# Patient Record
Sex: FEMALE | Race: OTHER | Employment: UNEMPLOYED | URBAN - METROPOLITAN AREA
[De-identification: names, ages, dates, MRNs, and addresses within clinical notes are randomized per-mention and may not be internally consistent; named-entity substitution may affect disease eponyms.]

---

## 2017-05-08 PROBLEM — F32.A DEPRESSION: Status: ACTIVE | Noted: 2017-05-08

## 2017-05-09 PROBLEM — T50.902A DRUG OVERDOSE, INTENTIONAL (HCC): Status: ACTIVE | Noted: 2017-05-09

## 2017-07-13 ENCOUNTER — APPOINTMENT (OUTPATIENT)
Dept: WOUND CARE | Facility: HOSPITAL | Age: 27
End: 2017-07-13
Attending: CLINICAL NURSE SPECIALIST

## 2018-01-20 NOTE — PROGRESS NOTES
1/20/2018  12:28 PM    Martha Curry is a 32year old female. Chief complaint(s): Patient presents with:  Fatigue  Varicose Veins    HPI:     Martha Curry primary complaint is regarding as above.      Martha Curry is a 32year old female he patient. Medications (Active prior to today's visit):    No current outpatient prescriptions on file. Allergies:  No Known Allergies      ROS:   Review of Systems   Constitutional: Negative for chills, fatigue and fever.    HENT: Negative for ear pain ([facility] [address]    MEDICATIONS:     No prescriptions requested or ordered in this encounter          REFERRALS: 1SPECIALTY (OTHER) - EXTERNAL   NAVIGATOR,      RECOMMENDATIONS given include: Please, call our office with any questions or concerns.  Notify Dr Mondragon Generous

## 2019-12-25 ENCOUNTER — HOSPITAL (OUTPATIENT)
Dept: OTHER | Age: 29
End: 2019-12-25

## 2019-12-25 LAB
ALBUMIN SERPL-MCNC: 4.3 G/DL (ref 3.6–5.1)
ALBUMIN/GLOB SERPL: 1.1 {RATIO} (ref 1–2.4)
ALP SERPL-CCNC: 76 UNITS/L (ref 45–117)
ALT SERPL-CCNC: 19 UNITS/L
AMPHETAMINES UR QL SCN>500 NG/ML: NEGATIVE
AMPHETAMINES UR QL SCN>500 NG/ML: NORMAL
ANALYZER ANC (IANC): ABNORMAL
ANION GAP SERPL CALC-SCNC: 9 MMOL/L (ref 10–20)
APAP SERPL-MCNC: <2 MCG/ML (ref 10–30)
AST SERPL-CCNC: 12 UNITS/L
BARBITURATES UR QL SCN>200 NG/ML: NEGATIVE
BARBITURATES UR QL SCN>200 NG/ML: NORMAL
BASOPHILS # BLD: 0 K/MCL (ref 0–0.3)
BASOPHILS NFR BLD: 0 %
BENZODIAZ UR QL SCN>200 NG/ML: NEGATIVE
BENZODIAZ UR QL SCN>200 NG/ML: NORMAL
BILIRUB SERPL-MCNC: 0.3 MG/DL (ref 0.2–1)
BUN SERPL-MCNC: 9 MG/DL (ref 6–20)
BUN/CREAT SERPL: 15 (ref 7–25)
BZE UR QL SCN>150 NG/ML: NEGATIVE
BZE UR QL SCN>150 NG/ML: NORMAL
CALCIUM SERPL-MCNC: 8.9 MG/DL (ref 8.4–10.2)
CANNABINOIDS UR QL SCN>50 NG/ML: NEGATIVE
CANNABINOIDS UR QL SCN>50 NG/ML: NORMAL
CHLORIDE SERPL-SCNC: 107 MMOL/L (ref 98–107)
CO2 SERPL-SCNC: 28 MMOL/L (ref 21–32)
CREAT SERPL-MCNC: 0.58 MG/DL (ref 0.51–0.95)
DIFFERENTIAL METHOD BLD: ABNORMAL
EOSINOPHIL # BLD: 0 K/MCL (ref 0.1–0.5)
EOSINOPHIL NFR BLD: 0 %
ERYTHROCYTE [DISTWIDTH] IN BLOOD: 12.6 % (ref 11–15)
ETHANOL SERPL-MCNC: NORMAL MG/DL
GLOBULIN SER-MCNC: 3.8 G/DL (ref 2–4)
GLUCOSE SERPL-MCNC: 102 MG/DL (ref 65–99)
HCG POINT OF CARE (5HGRST): NEGATIVE
HCT VFR BLD CALC: 35.5 % (ref 36–46.5)
HGB BLD-MCNC: 11.8 G/DL (ref 12–15.5)
IMM GRANULOCYTES # BLD AUTO: 0 K/MCL (ref 0–0.2)
IMM GRANULOCYTES NFR BLD: 0 %
LYMPHOCYTES # BLD: 1.2 K/MCL (ref 1–4.8)
LYMPHOCYTES NFR BLD: 16 %
MCH RBC QN AUTO: 29.8 PG (ref 26–34)
MCHC RBC AUTO-ENTMCNC: 33.2 G/DL (ref 32–36.5)
MCV RBC AUTO: 89.6 FL (ref 78–100)
MONOCYTES # BLD: 0.5 K/MCL (ref 0.3–0.9)
MONOCYTES NFR BLD: 6 %
NEUTROPHILS # BLD: 5.4 K/MCL (ref 1.8–7.7)
NEUTROPHILS NFR BLD: 78 %
NEUTS SEG NFR BLD: ABNORMAL %
NRBC (NRBCRE): 0 /100 WBC
OPIATES UR QL SCN>300 NG/ML: NEGATIVE
OPIATES UR QL SCN>300 NG/ML: NORMAL
PCP UR QL SCN>25 NG/ML: NEGATIVE
PCP UR QL SCN>25 NG/ML: NORMAL
PCP UR QL SCN>25 NG/ML: NORMAL
PLATELET # BLD: 286 K/MCL (ref 140–450)
POTASSIUM SERPL-SCNC: 3.8 MMOL/L (ref 3.4–5.1)
PROT SERPL-MCNC: 8.1 G/DL (ref 6.4–8.2)
RBC # BLD: 3.96 MIL/MCL (ref 4–5.2)
SALICYLATES SERPL-MCNC: <2.8 MG/DL
SODIUM SERPL-SCNC: 140 MMOL/L (ref 135–145)
WBC # BLD: 7 K/MCL (ref 4.2–11)

## 2019-12-25 PROCEDURE — 99285 EMERGENCY DEPT VISIT HI MDM: CPT | Performed by: EMERGENCY MEDICINE

## 2019-12-26 ENCOUNTER — DIAGNOSTIC TRANS (OUTPATIENT)
Dept: OTHER | Age: 29
End: 2019-12-26

## 2019-12-26 PROCEDURE — 93010 ELECTROCARDIOGRAM REPORT: CPT | Performed by: INTERNAL MEDICINE

## 2024-04-13 ENCOUNTER — HOSPITAL ENCOUNTER (EMERGENCY)
Facility: HOSPITAL | Age: 34
Discharge: HOME | End: 2024-04-14
Attending: EMERGENCY MEDICINE
Payer: COMMERCIAL

## 2024-04-13 VITALS
SYSTOLIC BLOOD PRESSURE: 107 MMHG | HEART RATE: 69 BPM | OXYGEN SATURATION: 99 % | TEMPERATURE: 99 F | WEIGHT: 140 LBS | BODY MASS INDEX: 28.22 KG/M2 | HEIGHT: 59 IN | RESPIRATION RATE: 18 BRPM | DIASTOLIC BLOOD PRESSURE: 70 MMHG

## 2024-04-13 DIAGNOSIS — B34.9 VIRAL SYNDROME: Primary | ICD-10-CM

## 2024-04-13 PROCEDURE — 0241U SARS-COV-2/FLU A AND B/RSV BY PCR (GENEXPERT): CPT | Performed by: EMERGENCY MEDICINE

## 2024-04-13 PROCEDURE — 99283 EMERGENCY DEPT VISIT LOW MDM: CPT

## 2024-04-13 PROCEDURE — 0241U SARS-COV-2/FLU A AND B/RSV BY PCR (GENEXPERT): CPT

## 2024-04-13 RX ORDER — FLUTICASONE PROPIONATE 50 MCG
2 SPRAY, SUSPENSION (ML) NASAL DAILY
Qty: 16 G | Refills: 0 | Status: SHIPPED | OUTPATIENT
Start: 2024-04-13 | End: 2024-05-13

## 2024-04-13 RX ORDER — CETIRIZINE HYDROCHLORIDE 10 MG/1
10 TABLET ORAL DAILY
Qty: 30 TABLET | Refills: 0 | Status: SHIPPED | OUTPATIENT
Start: 2024-04-13 | End: 2024-05-13

## 2024-04-14 LAB
FLUAV + FLUBV RNA SPEC NAA+PROBE: NEGATIVE
FLUAV + FLUBV RNA SPEC NAA+PROBE: POSITIVE
RSV RNA SPEC NAA+PROBE: NEGATIVE
SARS-COV-2 RNA RESP QL NAA+PROBE: NOT DETECTED

## 2024-04-14 NOTE — ED PROVIDER NOTES
Patient Seen in: [facility] Emergency Department    History   No chief complaint on file.      HPI    History is provided by patient/independent historian: patient  33 year old female with no significant past medical history here with complaints of flulike symptoms for the last 5 days.  The first few days were the worst, but she did start eating more.  She is still left with runny nose, chills, and feeling fatigued.  Her family was concerned she was feeling fatigued because her blood pressure was elevated.  She does not normally see a doctor.    History reviewed. History reviewed. No pertinent past medical history.      History reviewed.   Past Surgical History:   Procedure Laterality Date               Home Medications reviewed :  (Not in a hospital admission)        History reviewed.   Social History     Socioeconomic History    Marital status:    Tobacco Use    Smoking status: Never    Smokeless tobacco: Never   Substance and Sexual Activity    Alcohol use: Yes     Comment: Will drink ETOH socially, I will drink 1 or 2 glasses of wine.    Drug use: No    Sexual activity: Never     Partners: Male         ROS  Review of Systems   Constitutional:  Positive for chills and fatigue.   HENT:  Positive for rhinorrhea.    Respiratory:  Negative for shortness of breath.    Cardiovascular:  Negative for chest pain.   All other systems reviewed and are negative.     All other pertinent organ systems are reviewed and are negative.      Physical Exam     ED Triage Vitals [24 2334]   /70   Pulse 69   Resp 18   Temp 98.9 °F (37.2 °C)   Temp src Temporal   SpO2 99 %   O2 Device None (Room air)     Vital signs reviewed.      Physical Exam  Vitals and nursing note reviewed.   Cardiovascular:      Pulses: Normal pulses.   Pulmonary:      Effort: No respiratory distress.   Abdominal:      General: There is no distension.   Neurological:      Mental Status: She is alert.         ED Course       Labs:      Labs Reviewed   SARS-COV-2/FLU A AND B/RSV BY PCR (GENEXPERT)         My EKG Interpretation:   As reviewed and Interpreted by me      Imaging Results Available and Reviewed while in ED:   No results found.      Decision rules/scores evaluated: none      Diagnostic labs/tests considered but not ordered: CBC, BMP, CRP, CXR    ED Medications Administered: Medications - No data to display         - pt to f/u on mychart for genex results    MDM       Medical Decision Making      Differential Diagnosis: After obtaining the patient's history, performing the physical exam and reviewing the diagnostics, multiple initial diagnoses were considered based on the presenting problem including pneumonia, viral syndrome, strep pharyngitis, hypertension    External document review: I personally reviewed available external medical records for any recent pertinent discharge summaries, testing, and procedures - the findings are as follows: 6/12/20 visit with Dr. Villegas for covid    Complicating Factors: The patient already  has no past medical history on file. to contribute to the complexity of this ED evaluation.    Procedures performed: none    Discussed management with physician/appropriate source: none    Considered admission/deescalation of care for: none    Social determinants of health affecting patient care: none    Prescription medications considered: discussed continuing current medication regimen    The patient requires continuous monitoring for: fatigue, chills, rhinorrhea    Shared decision making: discussed possible admission        Disposition and Plan     Clinical Impression:  1. Viral syndrome        Disposition:  Discharge    Follow-up:  No follow-up provider specified.    Medications Prescribed:  Current Discharge Medication List        START taking these medications    Details   cetirizine 10 MG Oral Tab Take 1 tablet (10 mg total) by mouth daily.  Qty: 30 tablet, Refills: 0      fluticasone propionate 50 MCG/ACT  Nasal Suspension 2 sprays by Nasal route daily.  Qty: 16 g, Refills: 0

## 2024-12-15 ENCOUNTER — HOSPITAL ENCOUNTER (EMERGENCY)
Age: 34
Discharge: HOME | End: 2024-12-15
Attending: EMERGENCY MEDICINE

## 2024-12-15 ENCOUNTER — APPOINTMENT (OUTPATIENT)
Dept: ULTRASOUND IMAGING | Age: 34
End: 2024-12-15
Attending: EMERGENCY MEDICINE

## 2024-12-15 VITALS
HEART RATE: 80 BPM | SYSTOLIC BLOOD PRESSURE: 134 MMHG | TEMPERATURE: 97 F | RESPIRATION RATE: 16 BRPM | BODY MASS INDEX: 29 KG/M2 | DIASTOLIC BLOOD PRESSURE: 82 MMHG | OXYGEN SATURATION: 99 % | WEIGHT: 144.38 LBS

## 2024-12-15 DIAGNOSIS — I80.01 THROMBOPHLEBITIS OF SUPERFICIAL VEINS OF RIGHT LOWER EXTREMITY: Primary | ICD-10-CM

## 2024-12-15 LAB
ALBUMIN SERPL-MCNC: 4.1 G/DL (ref 3.2–4.8)
ALBUMIN/GLOB SERPL: 1.6 {RATIO} (ref 1–2)
ALP LIVER SERPL-CCNC: 52 U/L
ALT SERPL-CCNC: 12 U/L
ANION GAP SERPL CALC-SCNC: 5 MMOL/L (ref 0–18)
APTT PPP: 26.1 SECONDS (ref 23–36)
AST SERPL-CCNC: 18 U/L (ref ?–34)
BASOPHILS # BLD AUTO: 0.02 X10(3) UL (ref 0–0.2)
BASOPHILS NFR BLD AUTO: 0.3 %
BILIRUB SERPL-MCNC: 0.4 MG/DL (ref 0.3–1.2)
BUN BLD-MCNC: 7 MG/DL (ref 9–23)
CALCIUM BLD-MCNC: 9.7 MG/DL (ref 8.7–10.4)
CHLORIDE SERPL-SCNC: 108 MMOL/L (ref 98–112)
CO2 SERPL-SCNC: 24 MMOL/L (ref 21–32)
CREAT BLD-MCNC: 0.51 MG/DL
EGFRCR SERPLBLD CKD-EPI 2021: 124 ML/MIN/1.73M2 (ref 60–?)
EOSINOPHIL # BLD AUTO: 0.02 X10(3) UL (ref 0–0.7)
EOSINOPHIL NFR BLD AUTO: 0.3 %
ERYTHROCYTE [DISTWIDTH] IN BLOOD BY AUTOMATED COUNT: 13.1 %
GLOBULIN PLAS-MCNC: 2.6 G/DL (ref 2–3.5)
GLUCOSE BLD-MCNC: 108 MG/DL (ref 70–99)
HCT VFR BLD AUTO: 30.8 %
HGB BLD-MCNC: 10.9 G/DL
IMM GRANULOCYTES # BLD AUTO: 0.02 X10(3) UL (ref 0–1)
IMM GRANULOCYTES NFR BLD: 0.3 %
INR BLD: 0.94 (ref 0.8–1.2)
LYMPHOCYTES # BLD AUTO: 1.17 X10(3) UL (ref 1–4)
LYMPHOCYTES NFR BLD AUTO: 18 %
MCH RBC QN AUTO: 32.5 PG (ref 26–34)
MCHC RBC AUTO-ENTMCNC: 35.4 G/DL (ref 31–37)
MCV RBC AUTO: 91.9 FL
MONOCYTES # BLD AUTO: 0.43 X10(3) UL (ref 0.1–1)
MONOCYTES NFR BLD AUTO: 6.6 %
NEUTROPHILS # BLD AUTO: 4.83 X10 (3) UL (ref 1.5–7.7)
NEUTROPHILS # BLD AUTO: 4.83 X10(3) UL (ref 1.5–7.7)
NEUTROPHILS NFR BLD AUTO: 74.5 %
OSMOLALITY SERPL CALC.SUM OF ELEC: 283 MOSM/KG (ref 275–295)
PLATELET # BLD AUTO: 240 10(3)UL (ref 150–450)
POTASSIUM SERPL-SCNC: 3.5 MMOL/L (ref 3.5–5.1)
PROT SERPL-MCNC: 6.7 G/DL (ref 5.7–8.2)
PROTHROMBIN TIME: 12.4 SECONDS (ref 11.6–14.8)
RBC # BLD AUTO: 3.35 X10(6)UL
SODIUM SERPL-SCNC: 137 MMOL/L (ref 136–145)
WBC # BLD AUTO: 6.5 X10(3) UL (ref 4–11)

## 2024-12-15 PROCEDURE — 85610 PROTHROMBIN TIME: CPT | Performed by: EMERGENCY MEDICINE

## 2024-12-15 PROCEDURE — 96372 THER/PROPH/DIAG INJ SC/IM: CPT

## 2024-12-15 PROCEDURE — 93971 EXTREMITY STUDY: CPT | Performed by: EMERGENCY MEDICINE

## 2024-12-15 PROCEDURE — 80053 COMPREHEN METABOLIC PANEL: CPT | Performed by: EMERGENCY MEDICINE

## 2024-12-15 PROCEDURE — 85730 THROMBOPLASTIN TIME PARTIAL: CPT | Performed by: EMERGENCY MEDICINE

## 2024-12-15 PROCEDURE — 99284 EMERGENCY DEPT VISIT MOD MDM: CPT

## 2024-12-15 PROCEDURE — 36415 COLL VENOUS BLD VENIPUNCTURE: CPT

## 2024-12-15 PROCEDURE — 99285 EMERGENCY DEPT VISIT HI MDM: CPT

## 2024-12-15 PROCEDURE — 85025 COMPLETE CBC W/AUTO DIFF WBC: CPT | Performed by: EMERGENCY MEDICINE

## 2024-12-15 RX ORDER — ENOXAPARIN SODIUM 100 MG/ML
80 INJECTION SUBCUTANEOUS ONCE
Status: COMPLETED | OUTPATIENT
Start: 2024-12-15 | End: 2024-12-15

## 2024-12-15 RX ORDER — ENOXAPARIN SODIUM 100 MG/ML
1 INJECTION SUBCUTANEOUS NIGHTLY
Qty: 19.8 ML | Refills: 0 | Status: SHIPPED | OUTPATIENT
Start: 2024-12-15 | End: 2025-01-14

## 2024-12-15 RX ORDER — CHOLECALCIFEROL (VITAMIN D3) 25 MCG
1 TABLET,CHEWABLE ORAL DAILY
COMMUNITY

## 2024-12-15 NOTE — ED PROVIDER NOTES
Patient Seen in: [facility] [place]      History     Chief Complaint   Patient presents with    Deep Vein Thrombosis     Pt states that she is pregnant and developed a \"purple area on her leg.\" Pt states that she was told to come r/o blood clot in her leg     Stated Complaint: right leg \"purple area\" Pt states that she is 4 months pregnant    Subjective:   HPI      Patient sent for evaluation of a area of bruising on her right inner thigh.  She said this been there for some time but it comes and goes.  She has no chest pain or shortness of breath or palpitations.  No lightheadedness.  No history of VTE.  She is 16 weeks pregnant.  She denies any abdominal or pelvic complaints, no vaginal bleeding or discharge.  Denies any history of intracranial hemorrhage or GI bleeds.        Objective:     History reviewed. No pertinent past medical history.           Past Surgical History:   Procedure Laterality Date                      Social History     Socioeconomic History    Marital status:    Tobacco Use    Smoking status: Never    Smokeless tobacco: Never   Substance and Sexual Activity    Alcohol use: Yes     Comment: Will drink ETOH socially, I will drink 1 or 2 glasses of wine.    Drug use: No    Sexual activity: Never     Partners: Male     Social Drivers of Health     Financial Resource Strain: Not at Risk (2024)    Received from [employer]    Financial Resource Strain     Financial Resource Strain: 1   Food Insecurity: Not at Risk (2024)    Received from [employer]    Food Insecurity     Food: 1   Transportation Needs: Not at Risk (2024)    Received from [employer]    Transportation Needs     Transportation: 1   Stress: Not on File (10/30/2024)    Received from [employer]    Stress     Stress: 0   Housing Stability: Not at Risk (2024)    Received from [employer]    Housing Stability     Housin                  Physical Exam     ED Triage Vitals [12/15/24 1230]   /82   Pulse  80   Resp 16   Temp 97 °F (36.1 °C)   Temp src Temporal   SpO2 99 %   O2 Device None (Room air)       Current Vitals:   Vital Signs  BP: 134/82  Pulse: 80  Resp: 16  Temp: 97 °F (36.1 °C)  Temp src: Temporal    Oxygen Therapy  SpO2: 99 %  O2 Device: None (Room air)        Physical Exam  Physical Exam   Constitutional: Awake, alert, well appearing  Head: Normocephalic and atraumatic.   Eyes: Conjunctivae are normal. Pupils are equal, round, and reactive to light.   Neck: Normal range of motion. No JVD  Cardiovascular: Normal rate, regular rhythm  Pulmonary/Chest: Normal effort.  No accessory muscle use.  No cyanosis.  Abdominal: Soft. Not distended.  Neurological: Pt is alert and oriented to person, place, and time. no cranial nerve deficits. Speech fluent      Ecchymotic area above the knee on her right inner thigh.  Both calves are swollen nontender no edema easily palpated pulses.  Good skin color.    ED Course     Labs Reviewed   CBC WITH DIFFERENTIAL WITH PLATELET - Abnormal; Notable for the following components:       Result Value    RBC 3.35 (*)     HGB 10.9 (*)     HCT 30.8 (*)     All other components within normal limits   COMP METABOLIC PANEL (14)   PROTHROMBIN TIME (PT)   PTT, ACTIVATED              US VENOUS DOPPLER LEG RIGHT - DIAG IMG (CPT=93971)    Result Date: 12/15/2024  PROCEDURE:  US VENOUS DOPPLER LEG RIGHT - DIAG IMG (CPT=93971)  COMPARISON:  None.  INDICATIONS:  eval for DVT.  Right leg pain and swelling and discoloration.  TECHNIQUE:  Real time, grey scale, and duplex ultrasound was used to evaluate the lower extremity venous system. B-mode two-dimensional images of the vascular structures, Doppler spectral analysis, and color flow.  Doppler imaging were performed.  The following veins were imaged:  Common, deep, and superficial femoral, popliteal, sapheno-femoral junction, posterior tibial veins, and the contralateral common femoral vein.  PATIENT STATED HISTORY: (As transcribed by  Technologist)  Patient states she has bruising, warmth, and pain to the right medial knee.  She also states she has veins posterior upper right thigh that she is concerned about.    FINDINGS:  EXTREMITY EXAMINED:  Right leg SAPHENOFEMORAL JUNCTION:  No reflux. THROMBI:  There is no DVT visualized in the right leg.  There is superficial thrombus in the right greater saphenous vein from the level of the distal thigh to the knee over a length of 15 cm.  There are patent varicose veins 2-3 mm in diameter upper posterior right thigh at the site of pain COMPRESSION:  Normal compressibility, phasicity, and augmentation.            CONCLUSION:  There is no DVT in the right leg.  There is superficial thrombosis of the right greater saphenous vein over a length of 15 cm from the distal thigh to the knee in the area of skin bruising.  Patent varicose veins 2-3 mm in diameter upper posterior right thigh at the area of clinical concern per patient.   LOCATION:  [id number]    Dictated by (CST): Zahraa Thakkar MD on 12/15/2024 at 1:18 PM     Finalized by (CST): Zahraa Thakkar MD on 12/15/2024 at 1:19 PM             MDM            Differential diagnoses considered: DVT, SVT, ecchymosis    -Patient has a long segment of superficial venous thrombus in her greater saphenous vein.    -Discussed briefly with Dr. Erickson, hematology, agrees with intermediate dose Lovenox, 1 mg/kg daily.  She is weighing 65 kg I will give her 60 mg daily.  Here in [place] we only have 80 mg vials so I will give her her first dose today and asked her to take this nightly going forward.    I am going to check basic labs to make sure that her liver and kidney function are fine which they have been.  If these are normal she can discharged home.  RN will do bedside teaching on Lovenox injections with  present.      I visualized the radiology studies, my independent interpretation: No DVT present on ultrasound.    *Discussion of ongoing  management of this patient's care included: Dr. Erickson, hematology  *Comorbidities contributing to the complexity of decision making:  pregnant status    *External charts reviewed: n/a  *Additional sources of history:  services were offered.  Patient was comfortable with staff serving as 's.  All discharge instructions were given and questions were answered in her native language.    Shared decision making was done by: patient, myself.          Medical Decision Making      Disposition and Plan     Clinical Impression:  1. Thrombophlebitis of superficial veins of right lower extremity         Disposition:  Discharge  12/15/2024  1:59 pm    Follow-up:  Belinda Erickson MD  [address] [place] [id number]  [phone]    Call in 1 day(s)            Medications Prescribed:  Current Discharge Medication List        START taking these medications    Details   enoxaparin 60 MG/0.6ML Injection Solution Prefilled Syringe Inject 0.66 mL (66 mg total) into the skin at bedtime.  Qty: 19.8 mL, Refills: 0                 Supplementary Documentation:

## 2024-12-15 NOTE — ED INITIAL ASSESSMENT (HPI)
States she is approx 16 wks pregnant and noticed purple area behind her right knee and upper calf for 10 days. No injury

## 2024-12-15 NOTE — ED PROVIDER NOTES
Slightly low hemoglobin that would not be unanticipated with pregnancy otherwise unremarkable blood work patient discharged as per disposition

## 2024-12-16 ENCOUNTER — TELEPHONE (OUTPATIENT)
Dept: HEMATOLOGY/ONCOLOGY | Facility: HOSPITAL | Age: 34
End: 2024-12-16

## 2024-12-16 NOTE — TELEPHONE ENCOUNTER
----- Message from Belinda Erickson sent at 12/15/2024 10:00 PM CST -----  Pregnant patient-needs consult within 2 weeks-any provider [facility] OR [facility]

## 2025-01-15 NOTE — TELEPHONE ENCOUNTER
Pt is calling to schedule a new consult appt.    New Consult-Dr. Erickson  Referred by-Dr. Garcia- bruising in leg  Insurance-Medicaid  Please give pt a call back. Thank you.

## 2025-01-22 NOTE — PROGRESS NOTES
[facility] Hematology and Oncology Clinic Note      Visit Diagnosis:  1. Superficial thrombophlebitis, involving unspecified site        History of Present Illness: 34F referred by Dr. Summers to discuss a superficial thrombosis of the RLE. She is pregnant and due May 25, 2025.     -In 2024, she started to noticed more varicose veins and discoloration in RLE. She presented to the ER on 12/15/24 with right inner thigh discoloration (purple). No history of VTE. No family history of DVT. RLE doppler showed a superficial thrombosis of the right greater saphenous vein measuring 15 cm. No DVT. She was started on Lovenox 1 mg/kg daily.    Review of Systems: 12 Point ROS was completed and pertinent positives are in the HPI    Medications Ordered Prior to Encounter[1]  No past medical history on file.  Past Surgical History:   Procedure Laterality Date           Social History     Socioeconomic History    Marital status:    Tobacco Use    Smoking status: Never    Smokeless tobacco: Never   Substance and Sexual Activity    Alcohol use: Not Currently     Comment: Will drink ETOH socially, I will drink 1 or 2 glasses of wine.    Drug use: No    Sexual activity: Never     Partners: Male      Family History   Problem Relation Age of Onset    Diabetes Mother     Cancer Mother         cirrhrosis then cancer spread,liver Ca    Other (Other) Mother        Physical Exam  Height: --  Weight: 67 kg (147 lb 12.8 oz) (939)  BSA (Calculated - sq m): --  Pulse: 94 (939)  BP: 107/67 (939)  Temp: 98.1 °F (36.7 °C) (939)  Do Not Use - Resp Rate: --  SpO2: 98 % (939)    General: NAD, AOX3  HEENT: clear op, mmm, no jvd, no scleral icterus  LN: no supraclavicular, infraclavicular, axillary or inguinal LAD  CV: RRR S1S2 no murmurs  Extremities: RLE varicose veins, larger area of ecchymosis in inner R thigh  Lungs: CTAB, no increased work of breathing  Abd: soft nt nd +BS no  hepatosplenomegaly  Neuro: CN: II-XII grossly intact      Results:  Lab Results   Component Value Date    WBC 6.5 12/15/2024    HGB 10.9 (L) 12/15/2024    HCT 30.8 (L) 12/15/2024    MCV 91.9 12/15/2024    .0 12/15/2024       No results for input(s): \"GLU\", \"BUN\", \"CREATSERUM\", \"GFRAA\", \"GFRNAA\", \"EGFRCR\", \"CA\", \"ALB\", \"NA\", \"K\", \"CL\", \"CO2\", \"ALKPHO\", \"AST\", \"ALT\", \"BILT\", \"TP\" in the last 168 hours.    Radiology:  reviewed      Assessment and Plan:  RLE Superficial Venous Vein thrombosis (15 cm)  -This is a higher risk SVT given the length (>5 cm) and pregnancy  -We can continue lovenox 1 mg/kg daily. We will repeat an US to ensure no clot progression  -If there is clot progression, we can increase her dose to 1 mg/kg BID  -She will need to continue anticoagulation antepartum and 6 weeks postpartum   -Start heat and tylenol for cramping   -We can switch to heparin at 36 weeks     Mild Anemia  -Check Fe studies, B12, folate  -IV InFED for Ferritin < 50 or Fe sat < 20    RTC in 4 weeks for re-assessment     MONAE Erickson MD  [facility]         [1]   Current Outpatient Medications on File Prior to Visit   Medication Sig Dispense Refill    aspirin 81 MG Oral Tab EC Take 1 tablet (81 mg total) by mouth daily.      prenatal vitamin with DHA 27-0.8-228 MG Oral Cap Take 1 capsule by mouth daily.      enoxaparin 60 MG/0.6ML Injection Solution Prefilled Syringe Inject 0.66 mL (66 mg total) into the skin at bedtime. 19.8 mL 0     No current facility-administered medications on file prior to visit.

## 2025-01-24 ENCOUNTER — OFFICE VISIT (OUTPATIENT)
Age: 35
End: 2025-01-24
Attending: INTERNAL MEDICINE
Payer: MEDICAID

## 2025-01-24 ENCOUNTER — TELEPHONE (OUTPATIENT)
Age: 35
End: 2025-01-24

## 2025-01-24 ENCOUNTER — TELEPHONE (OUTPATIENT)
Dept: HEMATOLOGY/ONCOLOGY | Facility: HOSPITAL | Age: 35
End: 2025-01-24

## 2025-01-24 VITALS
RESPIRATION RATE: 18 BRPM | OXYGEN SATURATION: 98 % | DIASTOLIC BLOOD PRESSURE: 67 MMHG | HEART RATE: 94 BPM | TEMPERATURE: 98 F | BODY MASS INDEX: 30 KG/M2 | SYSTOLIC BLOOD PRESSURE: 107 MMHG | WEIGHT: 147.81 LBS

## 2025-01-24 DIAGNOSIS — I80.9 SUPERFICIAL THROMBOPHLEBITIS, INVOLVING UNSPECIFIED SITE: Primary | ICD-10-CM

## 2025-01-24 DIAGNOSIS — I82.811 SUPERFICIAL THROMBOSIS OF LEG, RIGHT: ICD-10-CM

## 2025-01-24 RX ORDER — ASPIRIN 81 MG/1
81 TABLET ORAL DAILY
COMMUNITY
Start: 2024-12-05

## 2025-01-24 NOTE — PROGRESS NOTES
Here for thrombophlebitis RLE. Causes her discomfort when she stands on it for long periods of time. She is taking lovenox injections. They are going well without any abnormal bleeding. She does not have a history of blood clots or family history of blood clots. She is due May 25th with a boy.

## 2025-01-24 NOTE — TELEPHONE ENCOUNTER
Marizol requested notes/labs from today's consult. She works with the Maternal Fetal Medicine doctor that Naina sees.

## 2025-01-24 NOTE — TELEPHONE ENCOUNTER
ASCTOM Fontana - [phone]     Marizol would like to get a call back [phone]  she   has information she would like to share. Thanks Sybil

## 2025-01-30 ENCOUNTER — TELEPHONE (OUTPATIENT)
Age: 35
End: 2025-01-30

## 2025-01-30 NOTE — TELEPHONE ENCOUNTER
Called regarding questions on mutual patient regarding appointment on 1/24/2025.  Left message to return call.

## 2025-02-05 ENCOUNTER — TELEPHONE (OUTPATIENT)
Age: 35
End: 2025-02-05

## 2025-02-05 NOTE — TELEPHONE ENCOUNTER
Received call from KOMAL Fontana at maternal/fetal medicine at [facility] asking if patient has had a recent ultrasound. Patient has yet to schedule. LVM with patient using  instructing her to schedule her ultrasound. Asked that patient call back as well. Will see patient 2/19 for follow-up.    #:[phone]

## 2025-02-06 NOTE — TELEPHONE ENCOUNTER
Spoke with Marizol. Patient will have ultrasound performed at [facility] next Wednesday 2/12/25. RN to fax report to our office upon completion. Fax number provided.

## 2025-02-18 NOTE — PROGRESS NOTES
[facility] Hematology and Oncology Clinic Note      Visit Diagnosis:  No diagnosis found.      History of Present Illness: 34F referred by Dr. Summers to discuss a superficial thrombosis of the RLE. She is pregnant and due May 25, 2025.     -In 2024, she started to noticed more varicose veins and discoloration in RLE. She presented to the ER on 12/15/24 with right inner thigh discoloration (purple). No history of VTE. No family history of DVT. RLE doppler showed a superficial thrombosis of the right greater saphenous vein measuring 15 cm. No DVT. She was started on Lovenox 1 mg/kg daily.    -US on     Interval History  -Labs from 25 ferritin 5, Hb 10    Review of Systems: 12 Point ROS was completed and pertinent positives are in the HPI    Medications Ordered Prior to Encounter[1]  No past medical history on file.  Past Surgical History:   Procedure Laterality Date           Social History     Socioeconomic History    Marital status:    Tobacco Use    Smoking status: Never    Smokeless tobacco: Never   Substance and Sexual Activity    Alcohol use: Not Currently     Comment: Will drink ETOH socially, I will drink 1 or 2 glasses of wine.    Drug use: No    Sexual activity: Never     Partners: Male      Family History   Problem Relation Age of Onset    Diabetes Mother     Cancer Mother         cirrhrosis then cancer spread,liver Ca    Other (Other) Mother        Physical Exam  Height: --  Weight: --  BSA (Calculated - sq m): --  Pulse: --  BP: --  Temp: --  Do Not Use - Resp Rate: --  SpO2: --    General: NAD, AOX3  HEENT: clear op, mmm, no jvd, no scleral icterus  LN: no supraclavicular, infraclavicular, axillary or inguinal LAD  CV: RRR S1S2 no murmurs  Extremities: RLE varicose veins, larger area of ecchymosis in inner R thigh  Lungs: CTAB, no increased work of breathing  Abd: soft nt nd +BS no hepatosplenomegaly  Neuro: CN: II-XII grossly intact      Results:  Lab Results   Component Value  Date    WBC 7.9 2025    HGB 10.1 (L) 2025    HCT 29.3 (L) 2025    MCV 91.6 2025    .0 2025       No results for input(s): \"GLU\", \"BUN\", \"CREATSERUM\", \"GFRAA\", \"GFRNAA\", \"EGFRCR\", \"CA\", \"ALB\", \"NA\", \"K\", \"CL\", \"CO2\", \"ALKPHO\", \"AST\", \"ALT\", \"BILT\", \"TP\" in the last 168 hours.    Radiology:  reviewed      Assessment and Plan:  RLE Superficial Venous Vein thrombosis (15 cm)  -This is a higher risk SVT given the length (>5 cm) and pregnancy  -We can continue lovenox 1 mg/kg daily. We will repeat an US to ensure no clot progression  -If there is clot progression, we can increase her dose to 1 mg/kg BID  -She will need to continue anticoagulation antepartum and 6 weeks postpartum   -Start heat and tylenol for cramping   -We can switch to heparin at 36 weeks     Mild Anemia  -Plan for IV InFED today  -IV InFED for Ferritin < 50 or Fe sat < 20    RTC in 4 weeks for re-assessment     MONAE Erickson MD  [facility]         [1]   Current Outpatient Medications on File Prior to Visit   Medication Sig Dispense Refill    aspirin 81 MG Oral Tab EC Take 1 tablet (81 mg total) by mouth daily.      prenatal vitamin with DHA 27-0.8-228 MG Oral Cap Take 1 capsule by mouth daily.      [] enoxaparin 60 MG/0.6ML Injection Solution Prefilled Syringe Inject 0.66 mL (66 mg total) into the skin at bedtime. 19.8 mL 0     No current facility-administered medications on file prior to visit.

## 2025-02-19 ENCOUNTER — APPOINTMENT (OUTPATIENT)
Age: 35
End: 2025-02-19
Attending: INTERNAL MEDICINE

## 2025-03-05 NOTE — ED PROVIDER NOTES
Patient Seen in: [facility] [place]      History     Chief Complaint   Patient presents with    Cough/URI     Stated Complaint: uri symptoms, cough, bruises on legs, 27 weeks pregnant    Subjective:   HPI      Patient is a 34 old female she is 27 weeks pregnant and she has had cough congestion for couple days now URI symptoms.  No fevers.  History of superficial thrombophlebitis of the right lower extremity.  Noted right lower extremity be slightly more bruised.  She is 27 weeks pregnant no bleeding no discharge.  She reports fetal movement.  No other complaints.    Objective:     History reviewed. No pertinent past medical history.           Past Surgical History:   Procedure Laterality Date                      Social History     Socioeconomic History    Marital status:    Tobacco Use    Smoking status: Never    Smokeless tobacco: Never   Vaping Use    Vaping status: Never Used   Substance and Sexual Activity    Alcohol use: Not Currently     Comment: Will drink ETOH socially, I will drink 1 or 2 glasses of wine.    Drug use: No    Sexual activity: Never     Partners: Male     Social Drivers of Health     Food Insecurity: Not at Risk (2024)    Received from [facility]    Food Insecurity     Food: 1   Transportation Needs: Not at Risk (2024)    Received from [facility]    Transportation Needs     Transportation: 1   Stress: Not on File (10/30/2024)    Received from [facility]    Stress     Stress: 0   Housing Stability: Not at Risk (2024)    Received from [facility]    Housing Stability     Housin                  Physical Exam     ED Triage Vitals [25 0023]   BP 97/65   Pulse 97   Resp 21   Temp 99 °F (37.2 °C)   Temp src Oral   SpO2 96 %   O2 Device None (Room air)       Current Vitals:   Vital Signs  BP: 97/65  Pulse: 97  Resp: 21  Temp: 99 °F (37.2 °C)  Temp src: Oral    Oxygen Therapy  SpO2: 96 %  O2 Device: None (Room air)        Physical Exam  Vitals and nursing note  reviewed.   Constitutional:       General: She is not in acute distress.     Appearance: She is well-developed. She is not toxic-appearing.   HENT:      Head: Normocephalic and atraumatic.   Eyes:      General: No scleral icterus.     Conjunctiva/sclera: Conjunctivae normal.   Cardiovascular:      Rate and Rhythm: Normal rate.   Pulmonary:      Effort: Pulmonary effort is normal. No respiratory distress.   Musculoskeletal:         General: No tenderness. Normal range of motion.      Cervical back: Normal range of motion and neck supple.   Skin:     General: Skin is warm and dry.      Findings: No rash.      Comments: Ecchymoses in the right thigh   Neurological:      Mental Status: She is alert and oriented to person, place, and time.      Motor: No abnormal muscle tone.      Coordination: Coordination normal.   Psychiatric:         Behavior: Behavior normal.            ED Course     Labs Reviewed   POCT FLU TEST - Abnormal; Notable for the following components:       Result Value    POCT INFLUENZA A Positive (*)     All other components within normal limits    Narrative:     This assay is a rapid molecular in vitro test utilizing nucleic acid amplification of influenza A and B viral RNA.   RAPID SARS-COV-2 BY PCR - Normal                    MDM                 -Comorbidities did add complexity to the management are mentioned in the HPI above        -I personally reviewed the prior external notes and the medical record to obtain additional history -I reviewed [facility] note from February 24.  Patient follows for high risk pregnancy.  She is on Lovenox 60 mg daily for history of superficial thrombophlebitis.         -DDX: Includes but not limited to DVT, thrombophlebitis, viral syndrome which is a medical condition that can pose a threat to life/function               -I personally reviewed the US findings and it shows no DVT.  Please refer to radiology report for official interpretation         US right lower extremity  venous Doppler      IMPRESSION:    No evidence of deep venous thrombosis in the right lower extremity.  The previously noted thrombophlebitis in the greater saphenous vein has apparently resolved.  Note is made of multifocal superficial bruising in the right lower extremity of unclear etiology.      Labs Reviewed   POCT FLU TEST - Abnormal; Notable for the following components:       Result Value    POCT INFLUENZA A Positive (*)     All other components within normal limits    Narrative:     This assay is a rapid molecular in vitro test utilizing nucleic acid amplification of influenza A and B viral RNA.   RAPID SARS-COV-2 BY PCR - Normal     Patient is positive for influenza.  She will be discharged on Tamiflu.  Ultrasound is reassuring as well.  There is some bruising but no DVT or thrombophlebitis.  Patient discharged home stable condition.  She will continue her anticoagulation until she is instructed by her OB/GYN.        Medical Decision Making      Disposition and Plan     Clinical Impression:  1. Influenza A         Disposition:  Discharge  3/5/2025  1:33 am    Follow-up:  [facility] - [address] [address]  [phone]  Follow up            Medications Prescribed:  Current Discharge Medication List        START taking these medications    Details   oseltamivir (TAMIFLU) 75 MG Oral Cap Take 1 capsule (75 mg total) by mouth 2 (two) times daily.  Qty: 10 capsule, Refills: 0                 Supplementary Documentation: